# Patient Record
Sex: FEMALE | Race: WHITE | NOT HISPANIC OR LATINO | Employment: UNEMPLOYED | URBAN - NONMETROPOLITAN AREA
[De-identification: names, ages, dates, MRNs, and addresses within clinical notes are randomized per-mention and may not be internally consistent; named-entity substitution may affect disease eponyms.]

---

## 2023-06-22 ENCOUNTER — CLINICAL SUPPORT (OUTPATIENT)
Dept: PRIMARY CARE | Facility: CLINIC | Age: 73
End: 2023-06-22
Payer: MEDICARE

## 2023-06-22 DIAGNOSIS — E03.9 HYPOTHYROIDISM, UNSPECIFIED TYPE: ICD-10-CM

## 2023-06-22 DIAGNOSIS — E55.9 VITAMIN D DEFICIENCY: ICD-10-CM

## 2023-06-22 DIAGNOSIS — E78.5 HYPERLIPIDEMIA, UNSPECIFIED HYPERLIPIDEMIA TYPE: ICD-10-CM

## 2023-06-22 DIAGNOSIS — I10 HYPERTENSION, UNSPECIFIED TYPE: ICD-10-CM

## 2023-06-22 LAB
ALANINE AMINOTRANSFERASE (SGPT) (U/L) IN SER/PLAS: 56 U/L (ref 7–45)
ALBUMIN (G/DL) IN SER/PLAS: 4.4 G/DL (ref 3.4–5)
ALKALINE PHOSPHATASE (U/L) IN SER/PLAS: 81 U/L (ref 33–136)
ANION GAP IN SER/PLAS: 16 MMOL/L (ref 10–20)
ASPARTATE AMINOTRANSFERASE (SGOT) (U/L) IN SER/PLAS: 32 U/L (ref 9–39)
BILIRUBIN TOTAL (MG/DL) IN SER/PLAS: 1.3 MG/DL (ref 0–1.2)
CALCIUM (MG/DL) IN SER/PLAS: 9.4 MG/DL (ref 8.6–10.3)
CARBON DIOXIDE, TOTAL (MMOL/L) IN SER/PLAS: 26 MMOL/L (ref 21–32)
CHLORIDE (MMOL/L) IN SER/PLAS: 103 MMOL/L (ref 98–107)
CHOLESTEROL (MG/DL) IN SER/PLAS: 136 MG/DL (ref 0–199)
CHOLESTEROL IN HDL (MG/DL) IN SER/PLAS: 62.4 MG/DL
CHOLESTEROL/HDL RATIO: 2.2
CREATININE (MG/DL) IN SER/PLAS: 0.86 MG/DL (ref 0.5–1.05)
GFR FEMALE: 72 ML/MIN/1.73M2
GLUCOSE (MG/DL) IN SER/PLAS: 139 MG/DL (ref 74–99)
LDL: 55 MG/DL (ref 0–99)
POTASSIUM (MMOL/L) IN SER/PLAS: 4.3 MMOL/L (ref 3.5–5.3)
PROTEIN TOTAL: 6.9 G/DL (ref 6.4–8.2)
SODIUM (MMOL/L) IN SER/PLAS: 141 MMOL/L (ref 136–145)
TRIGLYCERIDE (MG/DL) IN SER/PLAS: 95 MG/DL (ref 0–149)
UREA NITROGEN (MG/DL) IN SER/PLAS: 16 MG/DL (ref 6–23)
VLDL: 19 MG/DL (ref 0–40)

## 2023-06-22 PROCEDURE — 82306 VITAMIN D 25 HYDROXY: CPT

## 2023-06-22 PROCEDURE — 80061 LIPID PANEL: CPT

## 2023-06-22 PROCEDURE — 80053 COMPREHEN METABOLIC PANEL: CPT

## 2023-06-22 PROCEDURE — 84443 ASSAY THYROID STIM HORMONE: CPT

## 2023-06-23 LAB
CALCIDIOL (25 OH VITAMIN D3) (NG/ML) IN SER/PLAS: 29 NG/ML
THYROTROPIN (MIU/L) IN SER/PLAS BY DETECTION LIMIT <= 0.05 MIU/L: 1.59 MIU/L (ref 0.44–3.98)

## 2023-06-26 ENCOUNTER — OFFICE VISIT (OUTPATIENT)
Dept: PRIMARY CARE | Facility: CLINIC | Age: 73
End: 2023-06-26
Payer: MEDICARE

## 2023-06-26 VITALS
BODY MASS INDEX: 31.81 KG/M2 | OXYGEN SATURATION: 97 % | WEIGHT: 197.9 LBS | TEMPERATURE: 96.7 F | DIASTOLIC BLOOD PRESSURE: 80 MMHG | HEIGHT: 66 IN | HEART RATE: 107 BPM | SYSTOLIC BLOOD PRESSURE: 120 MMHG

## 2023-06-26 DIAGNOSIS — Z12.31 ENCOUNTER FOR SCREENING MAMMOGRAM FOR BREAST CANCER: ICD-10-CM

## 2023-06-26 DIAGNOSIS — Z00.00 ROUTINE GENERAL MEDICAL EXAMINATION AT HEALTH CARE FACILITY: Primary | ICD-10-CM

## 2023-06-26 PROCEDURE — 1159F MED LIST DOCD IN RCRD: CPT | Performed by: FAMILY MEDICINE

## 2023-06-26 PROCEDURE — 1170F FXNL STATUS ASSESSED: CPT | Performed by: FAMILY MEDICINE

## 2023-06-26 PROCEDURE — G0439 PPPS, SUBSEQ VISIT: HCPCS | Performed by: FAMILY MEDICINE

## 2023-06-26 RX ORDER — ATORVASTATIN CALCIUM 40 MG/1
40 TABLET, FILM COATED ORAL DAILY
COMMUNITY
End: 2023-10-17

## 2023-06-26 RX ORDER — CLONAZEPAM 0.5 MG/1
0.5 TABLET ORAL DAILY PRN
COMMUNITY
Start: 2023-04-21

## 2023-06-26 RX ORDER — AMLODIPINE BESYLATE 5 MG/1
5 TABLET ORAL DAILY
COMMUNITY
End: 2023-10-17

## 2023-06-26 RX ORDER — OLANZAPINE 15 MG/1
2 TABLET ORAL
COMMUNITY
Start: 2015-01-16

## 2023-06-26 RX ORDER — DESVENLAFAXINE SUCCINATE 50 MG/1
1 TABLET, EXTENDED RELEASE ORAL
COMMUNITY
Start: 2013-04-15

## 2023-06-26 RX ORDER — CARBIDOPA AND LEVODOPA 25; 100 MG/1; MG/1
TABLET ORAL
COMMUNITY
End: 2023-10-13 | Stop reason: SDUPTHER

## 2023-06-26 ASSESSMENT — ACTIVITIES OF DAILY LIVING (ADL)
DRESSING: INDEPENDENT
MANAGING_FINANCES: INDEPENDENT
BATHING: INDEPENDENT
GROCERY_SHOPPING: INDEPENDENT
DOING_HOUSEWORK: INDEPENDENT
TAKING_MEDICATION: INDEPENDENT

## 2023-06-26 ASSESSMENT — ENCOUNTER SYMPTOMS
NERVOUS/ANXIOUS: 0
OCCASIONAL FEELINGS OF UNSTEADINESS: 0
FEVER: 0
JOINT SWELLING: 0
DIZZINESS: 0
EYE ITCHING: 0
PALPITATIONS: 0
EYE DISCHARGE: 0
LOSS OF SENSATION IN FEET: 0
SORE THROAT: 0
SLEEP DISTURBANCE: 0
LIGHT-HEADEDNESS: 0
BLOOD IN STOOL: 0
HEADACHES: 0
FLANK PAIN: 0
NUMBNESS: 0
ACTIVITY CHANGE: 0
DYSPHORIC MOOD: 0
DYSURIA: 0
COUGH: 0
DEPRESSION: 0
WEAKNESS: 0
CONSTIPATION: 0
ABDOMINAL PAIN: 0
ARTHRALGIAS: 0
APPETITE CHANGE: 0
DIARRHEA: 0
SINUS PRESSURE: 0
MYALGIAS: 0
WHEEZING: 0
NAUSEA: 0
VOMITING: 0
SHORTNESS OF BREATH: 0
UNEXPECTED WEIGHT CHANGE: 0
RHINORRHEA: 0
HEMATURIA: 0

## 2023-06-26 ASSESSMENT — PATIENT HEALTH QUESTIONNAIRE - PHQ9
1. LITTLE INTEREST OR PLEASURE IN DOING THINGS: NOT AT ALL
SUM OF ALL RESPONSES TO PHQ9 QUESTIONS 1 AND 2: 0
2. FEELING DOWN, DEPRESSED OR HOPELESS: NOT AT ALL

## 2023-06-26 ASSESSMENT — VISUAL ACUITY
OS_CC: 20/20
OD_CC: 20/20

## 2023-06-26 NOTE — PROGRESS NOTES
"Subjective   Reason for Visit: Blossom Aguilar is an 72 y.o. female here for a Medicare Wellness visit.          Reviewed all medications by prescribing practitioner or clinical pharmacist (such as prescriptions, OTCs, herbal therapies and supplements) and documented in the medical record.    HPIFEELS GOOD MOST DAYS. HAS STABLE PARKINSON\"S HYPERTENSION AND HYPOTHYROID   LIPIDS ARE STABLE  LIVER ENZYMES ELEVATED BUT STABLE SUSPECT NALD     Patient Care Team:  Ashia Galarza DO as PCP - General     Review of Systems   Constitutional:  Negative for activity change, appetite change, fever and unexpected weight change.   HENT:  Negative for congestion, ear pain, postnasal drip, rhinorrhea, sinus pressure and sore throat.    Eyes:  Negative for discharge, itching and visual disturbance.   Respiratory:  Negative for cough, shortness of breath and wheezing.    Cardiovascular:  Negative for chest pain, palpitations and leg swelling.   Gastrointestinal:  Negative for abdominal pain, blood in stool, constipation, diarrhea, nausea and vomiting.   Endocrine: Negative for cold intolerance, heat intolerance and polyuria.   Genitourinary:  Negative for dysuria, flank pain and hematuria.   Musculoskeletal:  Negative for arthralgias, gait problem, joint swelling and myalgias.   Skin:  Negative for rash.   Allergic/Immunologic: Negative for environmental allergies and food allergies.   Neurological:  Negative for dizziness, syncope, weakness, light-headedness, numbness and headaches.   Psychiatric/Behavioral:  Negative for dysphoric mood and sleep disturbance. The patient is not nervous/anxious.        Objective   Vitals:  /80   Pulse 107   Temp 35.9 °C (96.7 °F)   Ht 1.676 m (5' 6\")   Wt 89.8 kg (197 lb 14.4 oz)   SpO2 97%   BMI 31.94 kg/m²       Physical Exam  Constitutional:       Appearance: Normal appearance.   HENT:      Head: Normocephalic and atraumatic.      Nose: Nose normal.      Mouth/Throat:      Mouth: " Mucous membranes are moist.   Eyes:      Extraocular Movements: Extraocular movements intact.      Pupils: Pupils are equal, round, and reactive to light.   Cardiovascular:      Rate and Rhythm: Normal rate and regular rhythm.   Pulmonary:      Effort: Pulmonary effort is normal.      Breath sounds: Normal breath sounds.   Abdominal:      Palpations: Abdomen is soft.   Musculoskeletal:         General: Normal range of motion.      Cervical back: Normal range of motion and neck supple.   Skin:     General: Skin is warm and dry.   Neurological:      General: No focal deficit present.      Mental Status: She is alert and oriented to person, place, and time. Mental status is at baseline.      Coordination: Coordination abnormal.      Gait: Gait normal.      Comments: PARKINSONIAN TREMORS AND MASKED FACIES    Psychiatric:         Mood and Affect: Mood normal.         Behavior: Behavior normal.         Assessment/Plan   Problem List Items Addressed This Visit    None  Visit Diagnoses       Routine general medical examination at health care facility    -  Primary    Encounter for screening mammogram for breast cancer        Relevant Orders    BI mammo bilateral screening tomosynthesis

## 2023-06-26 NOTE — PATIENT INSTRUCTIONS
See in 6 months   TAKE SOME VIT D OTC LIKE 4000 UNITS DAILY  REVIEWED LABS AND THEY ARE GOOD AND STABLE

## 2023-07-07 ENCOUNTER — TELEPHONE (OUTPATIENT)
Dept: PRIMARY CARE | Facility: CLINIC | Age: 73
End: 2023-07-07
Payer: MEDICARE

## 2023-07-07 NOTE — TELEPHONE ENCOUNTER
----- Message from Ashia Galarza DO sent at 7/7/2023  1:19 PM EDT -----  CALL PATIENT THAT HER MAMMOGRAM IS NORMAL FOLLOW IN ONE YEAR AT [facility]

## 2023-10-13 DIAGNOSIS — G20.A1 PARKINSON'S DISEASE WITHOUT DYSKINESIA OR FLUCTUATING MANIFESTATIONS (MULTI): Primary | ICD-10-CM

## 2023-10-13 RX ORDER — CARBIDOPA AND LEVODOPA 25; 100 MG/1; MG/1
1 TABLET ORAL 3 TIMES DAILY
Qty: 90 TABLET | Refills: 3 | Status: SHIPPED | OUTPATIENT
Start: 2023-10-13 | End: 2024-01-03 | Stop reason: SDUPTHER

## 2023-10-16 DIAGNOSIS — E78.5 HYPERLIPIDEMIA, UNSPECIFIED: ICD-10-CM

## 2023-10-16 DIAGNOSIS — I10 ESSENTIAL (PRIMARY) HYPERTENSION: ICD-10-CM

## 2023-10-17 RX ORDER — ATORVASTATIN CALCIUM 40 MG/1
40 TABLET, FILM COATED ORAL DAILY
Qty: 90 TABLET | Refills: 1 | Status: SHIPPED | OUTPATIENT
Start: 2023-10-17 | End: 2024-04-08

## 2023-10-17 RX ORDER — AMLODIPINE BESYLATE 5 MG/1
5 TABLET ORAL DAILY
Qty: 90 TABLET | Refills: 1 | Status: SHIPPED | OUTPATIENT
Start: 2023-10-17 | End: 2024-04-08

## 2023-10-18 RX ORDER — AMLODIPINE BESYLATE 5 MG/1
5 TABLET ORAL DAILY
Qty: 90 TABLET | Refills: 1 | OUTPATIENT
Start: 2023-10-18

## 2023-10-18 RX ORDER — ATORVASTATIN CALCIUM 40 MG/1
40 TABLET, FILM COATED ORAL DAILY
Qty: 90 TABLET | Refills: 1 | OUTPATIENT
Start: 2023-10-18

## 2023-12-07 ENCOUNTER — HOSPITAL ENCOUNTER (OUTPATIENT)
Dept: CARDIOLOGY | Facility: HOSPITAL | Age: 73
Discharge: HOME | End: 2023-12-07
Payer: MEDICARE

## 2023-12-07 ENCOUNTER — HOSPITAL ENCOUNTER (EMERGENCY)
Facility: HOSPITAL | Age: 73
Discharge: HOME | End: 2023-12-07
Attending: EMERGENCY MEDICINE
Payer: MEDICARE

## 2023-12-07 ENCOUNTER — APPOINTMENT (OUTPATIENT)
Dept: RADIOLOGY | Facility: HOSPITAL | Age: 73
End: 2023-12-07
Payer: MEDICARE

## 2023-12-07 VITALS
HEIGHT: 67 IN | OXYGEN SATURATION: 96 % | TEMPERATURE: 97.8 F | WEIGHT: 197 LBS | DIASTOLIC BLOOD PRESSURE: 56 MMHG | HEART RATE: 85 BPM | RESPIRATION RATE: 16 BRPM | SYSTOLIC BLOOD PRESSURE: 151 MMHG | BODY MASS INDEX: 30.92 KG/M2

## 2023-12-07 DIAGNOSIS — R42 VERTIGO: Primary | ICD-10-CM

## 2023-12-07 DIAGNOSIS — R03.0 ELEVATED BLOOD PRESSURE READING: ICD-10-CM

## 2023-12-07 LAB
ANION GAP SERPL CALC-SCNC: 12 MMOL/L (ref 10–20)
BASOPHILS # BLD AUTO: 0.05 X10*3/UL (ref 0–0.1)
BASOPHILS NFR BLD AUTO: 0.8 %
BUN SERPL-MCNC: 13 MG/DL (ref 6–23)
CALCIUM SERPL-MCNC: 9.9 MG/DL (ref 8.6–10.3)
CARDIAC TROPONIN I PNL SERPL HS: 9 NG/L (ref 0–13)
CHLORIDE SERPL-SCNC: 106 MMOL/L (ref 98–107)
CO2 SERPL-SCNC: 28 MMOL/L (ref 21–32)
CREAT SERPL-MCNC: 0.86 MG/DL (ref 0.5–1.05)
EOSINOPHIL # BLD AUTO: 0.07 X10*3/UL (ref 0–0.4)
EOSINOPHIL NFR BLD AUTO: 1.1 %
ERYTHROCYTE [DISTWIDTH] IN BLOOD BY AUTOMATED COUNT: 12.8 % (ref 11.5–14.5)
GFR SERPL CREATININE-BSD FRML MDRD: 72 ML/MIN/1.73M*2
GLUCOSE BLD MANUAL STRIP-MCNC: 145 MG/DL (ref 74–99)
GLUCOSE SERPL-MCNC: 140 MG/DL (ref 74–99)
HCT VFR BLD AUTO: 44 % (ref 36–46)
HGB BLD-MCNC: 14.5 G/DL (ref 12–16)
IMM GRANULOCYTES # BLD AUTO: 0.01 X10*3/UL (ref 0–0.5)
IMM GRANULOCYTES NFR BLD AUTO: 0.2 % (ref 0–0.9)
LACTATE SERPL-SCNC: 1.6 MMOL/L (ref 0.4–2)
LYMPHOCYTES # BLD AUTO: 1.82 X10*3/UL (ref 0.8–3)
LYMPHOCYTES NFR BLD AUTO: 27.6 %
MCH RBC QN AUTO: 31.5 PG (ref 26–34)
MCHC RBC AUTO-ENTMCNC: 33 G/DL (ref 32–36)
MCV RBC AUTO: 96 FL (ref 80–100)
MONOCYTES # BLD AUTO: 0.59 X10*3/UL (ref 0.05–0.8)
MONOCYTES NFR BLD AUTO: 9 %
NEUTROPHILS # BLD AUTO: 4.05 X10*3/UL (ref 1.6–5.5)
NEUTROPHILS NFR BLD AUTO: 61.3 %
NRBC BLD-RTO: 0 /100 WBCS (ref 0–0)
PHOSPHATE SERPL-MCNC: 3.1 MG/DL (ref 2.5–4.9)
PLATELET # BLD AUTO: 246 X10*3/UL (ref 150–450)
POTASSIUM SERPL-SCNC: 3.8 MMOL/L (ref 3.5–5.3)
RBC # BLD AUTO: 4.6 X10*6/UL (ref 4–5.2)
SODIUM SERPL-SCNC: 142 MMOL/L (ref 136–145)
WBC # BLD AUTO: 6.6 X10*3/UL (ref 4.4–11.3)

## 2023-12-07 PROCEDURE — 2500000001 HC RX 250 WO HCPCS SELF ADMINISTERED DRUGS (ALT 637 FOR MEDICARE OP): Performed by: EMERGENCY MEDICINE

## 2023-12-07 PROCEDURE — 82947 ASSAY GLUCOSE BLOOD QUANT: CPT

## 2023-12-07 PROCEDURE — 93005 ELECTROCARDIOGRAM TRACING: CPT

## 2023-12-07 PROCEDURE — 2500000004 HC RX 250 GENERAL PHARMACY W/ HCPCS (ALT 636 FOR OP/ED): Performed by: EMERGENCY MEDICINE

## 2023-12-07 PROCEDURE — 84100 ASSAY OF PHOSPHORUS: CPT | Performed by: EMERGENCY MEDICINE

## 2023-12-07 PROCEDURE — 70450 CT HEAD/BRAIN W/O DYE: CPT | Performed by: RADIOLOGY

## 2023-12-07 PROCEDURE — 36415 COLL VENOUS BLD VENIPUNCTURE: CPT | Performed by: EMERGENCY MEDICINE

## 2023-12-07 PROCEDURE — 96360 HYDRATION IV INFUSION INIT: CPT

## 2023-12-07 PROCEDURE — 84484 ASSAY OF TROPONIN QUANT: CPT | Performed by: EMERGENCY MEDICINE

## 2023-12-07 PROCEDURE — 80048 BASIC METABOLIC PNL TOTAL CA: CPT | Performed by: EMERGENCY MEDICINE

## 2023-12-07 PROCEDURE — 70450 CT HEAD/BRAIN W/O DYE: CPT

## 2023-12-07 PROCEDURE — 99285 EMERGENCY DEPT VISIT HI MDM: CPT | Mod: 25

## 2023-12-07 PROCEDURE — 83605 ASSAY OF LACTIC ACID: CPT | Performed by: EMERGENCY MEDICINE

## 2023-12-07 PROCEDURE — 85025 COMPLETE CBC W/AUTO DIFF WBC: CPT | Performed by: EMERGENCY MEDICINE

## 2023-12-07 PROCEDURE — 99284 EMERGENCY DEPT VISIT MOD MDM: CPT | Performed by: EMERGENCY MEDICINE

## 2023-12-07 RX ORDER — MECLIZINE HYDROCHLORIDE 25 MG/1
25 TABLET ORAL ONCE
Status: COMPLETED | OUTPATIENT
Start: 2023-12-07 | End: 2023-12-07

## 2023-12-07 RX ORDER — MECLIZINE HYDROCHLORIDE 25 MG/1
25 TABLET ORAL 3 TIMES DAILY PRN
Qty: 20 TABLET | Refills: 0 | Status: SHIPPED | OUTPATIENT
Start: 2023-12-07 | End: 2023-12-14

## 2023-12-07 RX ADMIN — SODIUM CHLORIDE 500 ML: 9 INJECTION, SOLUTION INTRAVENOUS at 06:12

## 2023-12-07 RX ADMIN — MECLIZINE HYDROCHLORIDE 25 MG: 25 TABLET ORAL at 06:12

## 2023-12-07 ASSESSMENT — PAIN SCALES - GENERAL
PAINLEVEL_OUTOF10: 0 - NO PAIN

## 2023-12-07 ASSESSMENT — COLUMBIA-SUICIDE SEVERITY RATING SCALE - C-SSRS
1. IN THE PAST MONTH, HAVE YOU WISHED YOU WERE DEAD OR WISHED YOU COULD GO TO SLEEP AND NOT WAKE UP?: NO
6. HAVE YOU EVER DONE ANYTHING, STARTED TO DO ANYTHING, OR PREPARED TO DO ANYTHING TO END YOUR LIFE?: NO
2. HAVE YOU ACTUALLY HAD ANY THOUGHTS OF KILLING YOURSELF?: NO

## 2023-12-07 ASSESSMENT — PAIN - FUNCTIONAL ASSESSMENT: PAIN_FUNCTIONAL_ASSESSMENT: 0-10

## 2023-12-07 NOTE — ED PROVIDER NOTES
HPI   Chief Complaint   Patient presents with    Dizziness     Pt reports dizziness that started 1 hour prior to arrival. Pt reports stood up and gets dizzy and fell to ground. Pt denies hitting head but reports has to sit down when she stands because she is dizzy. Pt denies history of dizziness and denies trauma or injury.         History provided by:  Patient, significant other and medical records   used: No         This patient presents to the emergency department ambulatory via private vehicle with chief complaint of dizziness.  Patient states that she got up to use the bathroom and when she stood up everything seemed to be spinning.  She had to sit down on the ground to prevent herself from falling.  She says the dizziness made it hard to get back up so she woke her  up and he brought her in.  She denies any injury.  No headache, no vision change, No speech deficit, weakness, numbness or tingling in extremities.  No chest pain, palpitations, nausea, vomiting.  No recent fevers, chills, coughs, URI symptoms.  No ear pain or sore throat.    Patient states that she went to bed around 11:00 and was feeling fine then.  She denies similar episodes previously.  No recent fevers, chills, coughs, URI symptoms.  She has a history of Parkinson's disease with resting tremors but she does not feel those are any different.               [place] Coma Scale Score: 15                  Patient History   Past Medical History:   Diagnosis Date    Benign neoplasm of left breast 03/06/2014    Benign neoplasm of breast, left    Depression     Encounter for screening mammogram for malignant neoplasm of breast     Visit for screening mammogram    Hypertension     Mammographic calcification found on diagnostic imaging of breast 02/13/2014    Breast calcifications    Other abnormal and inconclusive findings on diagnostic imaging of breast 02/13/2014    Abnormal mammogram    Parkinsons     Personal history of  other diseases of the respiratory system 05/30/2017    History of acute bronchitis    Personal history of other endocrine, nutritional and metabolic disease 05/20/2021    History of vitamin D deficiency    Personal history of other medical treatment     History of bone scan     Past Surgical History:   Procedure Laterality Date    COLONOSCOPY  04/21/2014    Complete Colonoscopy    MR HEAD ANGIO WO IV CONTRAST  4/2/2012    MR HEAD ANGIO WO IV CONTRAST 4/2/2012 [facility]LL[facility] LEGACY    OTHER SURGICAL HISTORY  03/06/2014    Bx Breast Percutan Needle Core Use Imag Guide (Stereotactic)     No family history on file.  Social History     Tobacco Use    Smoking status: Never    Smokeless tobacco: Never   Substance Use Topics    Alcohol use: Never    Drug use: Never       Physical Exam   ED Triage Vitals [12/07/23 0529]   Temp Heart Rate Resp BP   37.4 °C (99.3 °F) 100 18 (!) 156/92      SpO2 Temp Source Heart Rate Source Patient Position   97 % Temporal Monitor Sitting      BP Location FiO2 (%)     Right arm --       Physical Exam  Vitals reviewed.   Constitutional:       Appearance: Normal appearance.   HENT:      Head: Normocephalic and atraumatic.      Right Ear: Tympanic membrane normal.      Left Ear: Tympanic membrane normal.      Nose: Nose normal.      Mouth/Throat:      Mouth: Mucous membranes are moist.      Pharynx: Oropharynx is clear.   Eyes:      Extraocular Movements: Extraocular movements intact.      Pupils: Pupils are equal, round, and reactive to light.      Comments: No nystagmus   Neck:      Vascular: No carotid bruit.   Cardiovascular:      Rate and Rhythm: Normal rate and regular rhythm.      Pulses: Normal pulses.      Heart sounds: Normal heart sounds.   Pulmonary:      Effort: Pulmonary effort is normal.      Breath sounds: Normal breath sounds.   Abdominal:      Palpations: Abdomen is soft.      Tenderness: There is no abdominal tenderness.   Musculoskeletal:         General: Normal range of  motion.      Cervical back: Normal range of motion and neck supple. No rigidity.   Skin:     General: Skin is warm and dry.      Capillary Refill: Capillary refill takes less than 2 seconds.   Neurological:      General: No focal deficit present.      Mental Status: She is alert and oriented to person, place, and time.      Comments: On neuro exam: Alert and oriented X3. GCS 4-5-6. CN intact. Strength 5/5 in all 4 extremities. Sensation is intact to light touch throughout. Reflexes 2+ and symmetric. Normal Finger-nose-finger.   No ataxia. NIH 0.  Resting tremor related to Parkinson's disease.     Psychiatric:         Mood and Affect: Mood normal.           Labs Reviewed   BASIC METABOLIC PANEL - Abnormal       Result Value    Glucose 140 (*)     Sodium 142      Potassium 3.8      Chloride 106      Bicarbonate 28      Anion Gap 12      Urea Nitrogen 13      Creatinine 0.86      eGFR 72      Calcium 9.9     POCT GLUCOSE - Abnormal    POCT Glucose 145 (*)    LACTATE - Normal    Lactate 1.6      Narrative:     Venipuncture immediately after or during the administration of Metamizole may lead to falsely low results. Testing should be performed immediately  prior to Metamizole dosing.   TROPONIN I, HIGH SENSITIVITY - Normal    Troponin I, High Sensitivity 9      Narrative:     Less than 99th percentile of normal range cutoff-  Female and children under 18 years old <14 ng/L; Male <21 ng/L: Negative  Repeat testing should be performed if clinically indicated.     Female and children under 18 years old 14-50 ng/L; Male 21-50 ng/L:  Consistent with possible cardiac damage and possible increased clinical   risk. Serial measurements may help to assess extent of myocardial damage.     >50 ng/L: Consistent with cardiac damage, increased clinical risk and  myocardial infarction. Serial measurements may help assess extent of   myocardial damage.      NOTE: Children less than 1 year old may have higher baseline troponin   levels  and results should be interpreted in conjunction with the overall   clinical context.     NOTE: Troponin I testing is performed using a different   testing methodology at [facility] than at other   [facility] hospitals. Direct result comparisons should only   be made within the same method.   PHOSPHORUS - Normal    Phosphorus 3.1     CBC WITH AUTO DIFFERENTIAL    WBC 6.6      nRBC 0.0      RBC 4.60      Hemoglobin 14.5      Hematocrit 44.0      MCV 96      MCH 31.5      MCHC 33.0      RDW 12.8      Platelets 246      Neutrophils % 61.3      Immature Granulocytes %, Automated 0.2      Lymphocytes % 27.6      Monocytes % 9.0      Eosinophils % 1.1      Basophils % 0.8      Neutrophils Absolute 4.05      Immature Granulocytes Absolute, Automated 0.01      Lymphocytes Absolute 1.82      Monocytes Absolute 0.59      Eosinophils Absolute 0.07      Basophils Absolute 0.05       CT head wo IV contrast   Final Result   No acute intracranial hemorrhage, mass effect or midline shift.             MACRO:   None.        Signed by: Evan Finkelstein 2023 6:09 AM   Dictation workstation:   [id number]        ED Medication Administration from 2023 0518 to 2023 0645         Date/Time Order Dose Route Action Action by     2023 0612 EST meclizine (Antivert) tablet 25 mg 25 mg oral Given DIANA Kate     2023 0612 EST sodium chloride 0.9 % bolus 500 mL 500 mL intravenous New Bag DIANA Kate          'EK --twelve-lead EKG was obtained and read by me. This demonstrates normal sinus rhythm with a rate of 100, poor R wave progression, normal intervals, no ectopy, no ischemia, no pericarditis. Compared to most recent prior EKG, normal sinus rhythm has replaced sinus tachycardia.  ED Course & MDM   Diagnoses as of 23 0708   Vertigo   Elevated blood pressure reading     0640 -- feeling much better, waiting for results  0655 --up ambulatory to the bathroom without any visible ataxia, gait  disturbance.  Results reviewed    Medical Decision Making  This patient presents to the emergency department with the above history and physical.  Patient has no signs of sepsis, dehydration, acute stroke syndrome, trauma.  No evidence of otitis, mastoiditis, sinusitis.  Acute positional vertigo is present.  Differential diagnosis includes, but is not limited to sinusitis, stroke, arrhythmia, dehydration, orthostasis, hypotension, anemia, electrolyte imbalance.    Evaluation in the emergency department includes negative head CT, normal EKG, normal chemistries except for glucose of 140 without any evidence of DKA or nonketotic hyperosmolar coma.  Patient had borderline positive orthostatic vital signs; therefore, treated symptomatically with oral meclizine and a 500 cc fluid bolus.  She is feeling much better after this.  Symptoms most consistent with peripheral vertigo.  Prescription provided for meclizine.  Safety measures discussed.  Patient's  present at the bed and advised of all findings and recommendations.    Patient presented to the emergency room with asymptomatic hypertension. There is no evidence of end organ dysfunction. There is no indication to acutely lower the blood pressure. Importance of follow-up was strongly stressed.    Results of exam and any testing were discussed with patient/family. To the best of my ability, I answered all questions. At this time, there is no indication for admission/transfer or further diagnostic testing. Patient understands to return for any new or worsening symptoms, or failure to improve as anticipated. The importance of follow-up was stressed.    Procedure  Procedures     Macrina Youssef MD  12/07/23 7194

## 2023-12-07 NOTE — DISCHARGE INSTRUCTIONS
Change positions slowly and gradually.  Drink plenty of fluids.    Avoid activities which could be dangerous if you get a dizzy spells such as driving, climbing, swimming, operating power tools or machinery.    Return to the emergency department for uncontrolled vomiting falls or injuries, any new neurologic symptoms such as speech problems, vision changes, facial drooping, weakness of an arm or leg.

## 2023-12-12 ENCOUNTER — OFFICE VISIT (OUTPATIENT)
Dept: PRIMARY CARE | Facility: CLINIC | Age: 73
End: 2023-12-12
Payer: MEDICARE

## 2023-12-12 VITALS
OXYGEN SATURATION: 95 % | SYSTOLIC BLOOD PRESSURE: 128 MMHG | TEMPERATURE: 97.7 F | HEART RATE: 107 BPM | WEIGHT: 200 LBS | BODY MASS INDEX: 31.32 KG/M2 | DIASTOLIC BLOOD PRESSURE: 64 MMHG

## 2023-12-12 DIAGNOSIS — R42 VERTIGO: Primary | ICD-10-CM

## 2023-12-12 PROCEDURE — 1036F TOBACCO NON-USER: CPT | Performed by: FAMILY MEDICINE

## 2023-12-12 PROCEDURE — 1159F MED LIST DOCD IN RCRD: CPT | Performed by: FAMILY MEDICINE

## 2023-12-12 PROCEDURE — 99214 OFFICE O/P EST MOD 30 MIN: CPT | Performed by: FAMILY MEDICINE

## 2023-12-12 PROCEDURE — 1126F AMNT PAIN NOTED NONE PRSNT: CPT | Performed by: FAMILY MEDICINE

## 2023-12-12 RX ORDER — MECLIZINE HYDROCHLORIDE 25 MG/1
25 TABLET ORAL 3 TIMES DAILY PRN
Qty: 30 TABLET | Refills: 11 | Status: SHIPPED | OUTPATIENT
Start: 2023-12-12 | End: 2024-12-11

## 2023-12-12 ASSESSMENT — ENCOUNTER SYMPTOMS
ABDOMINAL PAIN: 0
NUMBNESS: 0
WEAKNESS: 0
EYE ITCHING: 0
DYSPHORIC MOOD: 0
SORE THROAT: 0
CONSTIPATION: 0
DYSURIA: 0
COUGH: 0
LIGHT-HEADEDNESS: 0
ACTIVITY CHANGE: 0
DIZZINESS: 1
TREMORS: 1
HEADACHES: 0
APPETITE CHANGE: 0
FLANK PAIN: 0
NAUSEA: 0
PALPITATIONS: 0
SINUS PRESSURE: 0
SLEEP DISTURBANCE: 0
EYE DISCHARGE: 0
RHINORRHEA: 0
DIARRHEA: 0
NERVOUS/ANXIOUS: 0
JOINT SWELLING: 0
HEMATURIA: 0
VOMITING: 0
WHEEZING: 0
BLOOD IN STOOL: 0
SHORTNESS OF BREATH: 0
UNEXPECTED WEIGHT CHANGE: 0
MYALGIAS: 0
ARTHRALGIAS: 0
FEVER: 0

## 2023-12-12 NOTE — PATIENT INSTRUCTIONS
REFILLED ANTIVERT (MECLIZINE)  SEE THE NEUROLOGIST IN JANUARY LIKE PLANNED   FOLLOW HERE WHEN NEEDED     GET THAT FLU VACCINE AT DRUG STORE OR HEALTH DEPARTMENT

## 2023-12-12 NOTE — PROGRESS NOTES
"Subjective   Patient ID: Blossom Aguilar is a 73 y.o. female who presents for Hospital Follow-up ([place]- Vertigo- Labs, IV, and CT completed. Meclizine given.  Blossom is feeling better and has a short term of the Meclizine at home.).    HPI GOT UP AND HAD WHIRLING VERTIGO AND FELL  MECLIZINE HELPS     Review of Systems   Constitutional:  Negative for activity change, appetite change, fever and unexpected weight change.   HENT:  Negative for congestion, ear pain, postnasal drip, rhinorrhea, sinus pressure and sore throat.    Eyes:  Negative for discharge, itching and visual disturbance.   Respiratory:  Negative for cough, shortness of breath and wheezing.    Cardiovascular:  Negative for chest pain, palpitations and leg swelling.   Gastrointestinal:  Negative for abdominal pain, blood in stool, constipation, diarrhea, nausea and vomiting.   Endocrine: Negative for cold intolerance, heat intolerance and polyuria.   Genitourinary:  Negative for dysuria, flank pain and hematuria.   Musculoskeletal:  Negative for arthralgias, gait problem, joint swelling and myalgias.   Skin:  Negative for rash.   Allergic/Immunologic: Negative for environmental allergies and food allergies.   Neurological:  Positive for dizziness and tremors. Negative for syncope, weakness, light-headedness, numbness and headaches.        BETTER WITH THE VERTIGO WITH MEDICINE   TREMORS FROM HER PARKINSON\"S DS    Psychiatric/Behavioral:  Negative for dysphoric mood and sleep disturbance. The patient is not nervous/anxious.        Objective   /64   Pulse 107   Temp 36.5 °C (97.7 °F)   Wt 90.7 kg (200 lb)   SpO2 95%   BMI 31.32 kg/m²     Physical Exam  Vitals and nursing note reviewed.   Constitutional:       Appearance: Normal appearance.   HENT:      Head: Normocephalic.      Mouth/Throat:      Mouth: Mucous membranes are moist.   Cardiovascular:      Rate and Rhythm: Normal rate and regular rhythm.      Pulses: Normal pulses.      Heart " sounds: Normal heart sounds. No murmur heard.     No friction rub. No gallop.   Pulmonary:      Effort: Pulmonary effort is normal. No respiratory distress.      Breath sounds: Normal breath sounds. No wheezing.   Abdominal:      General: Bowel sounds are normal. There is no distension.      Palpations: Abdomen is soft.      Tenderness: There is no abdominal tenderness.   Musculoskeletal:         General: No deformity. Normal range of motion.   Skin:     General: Skin is warm and dry.      Capillary Refill: Capillary refill takes less than 2 seconds.   Neurological:      General: No focal deficit present.      Mental Status: She is alert and oriented to person, place, and time. Mental status is at baseline.      Coordination: Coordination abnormal.      Gait: Gait abnormal.      Deep Tendon Reflexes: Reflexes abnormal.      Comments: VERTIGO RESOLVED FOR NOW WITH ANTIVERT   SEES NEUROLOGY SOON    Psychiatric:         Mood and Affect: Mood normal.         Assessment/Plan   Diagnoses and all orders for this visit:  Vertigo  -     meclizine (Antivert) 25 mg tablet; Take 1 tablet (25 mg) by mouth 3 times a day as needed for dizziness.

## 2024-01-02 ENCOUNTER — OFFICE VISIT (OUTPATIENT)
Dept: PRIMARY CARE | Facility: CLINIC | Age: 74
End: 2024-01-02
Payer: MEDICARE

## 2024-01-02 VITALS
OXYGEN SATURATION: 99 % | TEMPERATURE: 96.9 F | SYSTOLIC BLOOD PRESSURE: 146 MMHG | HEART RATE: 114 BPM | BODY MASS INDEX: 31.17 KG/M2 | DIASTOLIC BLOOD PRESSURE: 76 MMHG | WEIGHT: 199 LBS

## 2024-01-02 DIAGNOSIS — L30.4 INTERTRIGO: Primary | ICD-10-CM

## 2024-01-02 PROCEDURE — 1159F MED LIST DOCD IN RCRD: CPT | Performed by: FAMILY MEDICINE

## 2024-01-02 PROCEDURE — 1036F TOBACCO NON-USER: CPT | Performed by: FAMILY MEDICINE

## 2024-01-02 PROCEDURE — 99213 OFFICE O/P EST LOW 20 MIN: CPT | Performed by: FAMILY MEDICINE

## 2024-01-02 PROCEDURE — 1126F AMNT PAIN NOTED NONE PRSNT: CPT | Performed by: FAMILY MEDICINE

## 2024-01-02 RX ORDER — NYSTATIN 100000 U/G
CREAM TOPICAL 2 TIMES DAILY
Qty: 30 G | Refills: 3 | Status: SHIPPED | OUTPATIENT
Start: 2024-01-02 | End: 2024-01-09

## 2024-01-02 NOTE — PROGRESS NOTES
Subjective   Patient ID: Blossom Aguilar is a 73 y.o. female who presents for Rash (Moist rash under L breast for 3 weeks.).    HPI PATIENT WAS HERE RECENTLY FOR GENERAL   THIS RASH IS RATHER NEW  JUST WASHING BETTER TO HELP IT     Review of SystemsNO INTERVAL HISTORICAL CHANGES IN ANY OF THE 12 SYSTEMS REVIEWED OTHER THAN  DEVELOPMENT OF A RASH UNDER HER LEFT BREAST     Objective   /76   Pulse (!) 114   Temp 36.1 °C (96.9 °F)   Wt 90.3 kg (199 lb)   SpO2 99%   BMI 31.17 kg/m²     Physical ExamSINCE RECENT VISIT NO INTERVAL PHYSICAL CHANGES IN ANY OF THE 12 SYSTEMS REVIEWED OTHER THAN A RED RASH MOIST UNDER THE LEFT BREAST     Assessment/Plan   Diagnoses and all orders for this visit:  Intertrigo  -     nystatin (Mycostatin) cream; Apply topically 2 times a day for 7 days. apply to affected area

## 2024-01-03 ENCOUNTER — OFFICE VISIT (OUTPATIENT)
Dept: NEUROLOGY | Facility: CLINIC | Age: 74
End: 2024-01-03
Payer: MEDICARE

## 2024-01-03 VITALS
BODY MASS INDEX: 31.39 KG/M2 | HEART RATE: 93 BPM | DIASTOLIC BLOOD PRESSURE: 84 MMHG | SYSTOLIC BLOOD PRESSURE: 150 MMHG | WEIGHT: 200 LBS | HEIGHT: 67 IN

## 2024-01-03 DIAGNOSIS — K11.7 SIALORRHEA: Primary | ICD-10-CM

## 2024-01-03 DIAGNOSIS — G20.A1 PARKINSON'S DISEASE WITHOUT DYSKINESIA OR FLUCTUATING MANIFESTATIONS (MULTI): ICD-10-CM

## 2024-01-03 PROCEDURE — 1160F RVW MEDS BY RX/DR IN RCRD: CPT | Performed by: PSYCHIATRY & NEUROLOGY

## 2024-01-03 PROCEDURE — 1036F TOBACCO NON-USER: CPT | Performed by: PSYCHIATRY & NEUROLOGY

## 2024-01-03 PROCEDURE — 99214 OFFICE O/P EST MOD 30 MIN: CPT | Performed by: PSYCHIATRY & NEUROLOGY

## 2024-01-03 PROCEDURE — 1126F AMNT PAIN NOTED NONE PRSNT: CPT | Performed by: PSYCHIATRY & NEUROLOGY

## 2024-01-03 PROCEDURE — 1159F MED LIST DOCD IN RCRD: CPT | Performed by: PSYCHIATRY & NEUROLOGY

## 2024-01-03 RX ORDER — GLYCOPYRROLATE 1 MG/1
0.5 TABLET ORAL NIGHTLY
Qty: 45 TABLET | Refills: 3 | Status: SHIPPED | OUTPATIENT
Start: 2024-01-03 | End: 2025-01-02

## 2024-01-03 RX ORDER — CARBIDOPA AND LEVODOPA 25; 100 MG/1; MG/1
1 TABLET ORAL 3 TIMES DAILY
Qty: 270 TABLET | Refills: 3 | Status: SHIPPED | OUTPATIENT
Start: 2024-01-03 | End: 2025-01-02

## 2024-01-03 ASSESSMENT — ENCOUNTER SYMPTOMS
VOICE CHANGE: 0
ABDOMINAL PAIN: 0
SPEECH DIFFICULTY: 0
WOUND: 0
APPETITE CHANGE: 0
DIARRHEA: 0
DECREASED CONCENTRATION: 0
JOINT SWELLING: 0
NECK PAIN: 0
SINUS PAIN: 0
DIFFICULTY URINATING: 0
SINUS PRESSURE: 0
EYE REDNESS: 0
LIGHT-HEADEDNESS: 0
BRUISES/BLEEDS EASILY: 0
SLEEP DISTURBANCE: 0
AGITATION: 0
DYSURIA: 0
WHEEZING: 0
BLOOD IN STOOL: 0
APNEA: 0
FREQUENCY: 0
EYE DISCHARGE: 0
HYPERACTIVE: 0
COLOR CHANGE: 0
STRIDOR: 0
NUMBNESS: 0
DIAPHORESIS: 0
DIZZINESS: 0
FLANK PAIN: 0
ANAL BLEEDING: 0
CONFUSION: 0
TREMORS: 1
POLYDIPSIA: 0
PALPITATIONS: 0
FATIGUE: 0
NAUSEA: 0
FACIAL ASYMMETRY: 0
RECTAL PAIN: 0
MYALGIAS: 0
HALLUCINATIONS: 0
SEIZURES: 0
BACK PAIN: 0
FEVER: 0
DYSPHORIC MOOD: 0
ABDOMINAL DISTENTION: 0
HEADACHES: 0
CHILLS: 0
ADENOPATHY: 0
SORE THROAT: 0
VOMITING: 0
CHEST TIGHTNESS: 0
CONSTIPATION: 0
EYE PAIN: 0
TROUBLE SWALLOWING: 0
COUGH: 0
NECK STIFFNESS: 0
ACTIVITY CHANGE: 0
EYE ITCHING: 0
CHOKING: 0
SHORTNESS OF BREATH: 0
NERVOUS/ANXIOUS: 0
ARTHRALGIAS: 0

## 2024-01-03 ASSESSMENT — VISUAL ACUITY: VA_NORMAL: 1

## 2024-01-03 NOTE — PROGRESS NOTES
Consults    Chief complaint: Parkinson's disease: follow up and new problem : Sialorrhea    History Of Present Illness  Last time she was seen before COVID pandemic and she is here for follow-up after long time.     Blossom Aguilar is a 73 y.o. female presenting with PD and on Sinemet 25/101 pill 3 times a day.  Patient is doing very good on it there is no interval progression and bradykinesia rigidity or any history of falls.  She has chronic stable resting tremors and anxiety associated tremors which has not changed and she is not interested to get any more medications regarding tremor control.  I mention about amantadine we can add that if needed for tremor control but she does not want any pills.  In addition she is complaining about severe lutea at night so for that I added glycopyrrolate.  Patient remains independent for daily life activities dressing bathing eating.    Past Medical and Surgical History    Past Medical History:   Diagnosis Date    Benign neoplasm of left breast 03/06/2014    Benign neoplasm of breast, left    Depression     Encounter for screening mammogram for malignant neoplasm of breast     Visit for screening mammogram    Hypertension     Mammographic calcification found on diagnostic imaging of breast 02/13/2014    Breast calcifications    Other abnormal and inconclusive findings on diagnostic imaging of breast 02/13/2014    Abnormal mammogram    Parkinsons     Personal history of other diseases of the respiratory system 05/30/2017    History of acute bronchitis    Personal history of other endocrine, nutritional and metabolic disease 05/20/2021    History of vitamin D deficiency    Personal history of other medical treatment     History of bone scan          Past Surgical History:   Procedure Laterality Date    COLONOSCOPY  04/21/2014    Complete Colonoscopy    MR HEAD ANGIO WO IV CONTRAST  4/2/2012    MR HEAD ANGIO WO IV CONTRAST 4/2/2012 CON ANCILLARY LEGACY    OTHER SURGICAL HISTORY   03/06/2014    Bx Breast Percutan Needle Core Use Imag Guide (Stereotactic)        Social History  Social History     Tobacco Use    Smoking status: Never    Smokeless tobacco: Never   Vaping Use    Vaping Use: Never used   Substance Use Topics    Alcohol use: Never    Drug use: Never     Allergies  Patient has no known allergies.  (Not in a hospital admission)      Review of Systems   Constitutional:  Negative for activity change, appetite change, chills, diaphoresis, fatigue and fever.   HENT:  Negative for congestion, dental problem, drooling, ear discharge, sinus pressure, sinus pain, sneezing, sore throat, tinnitus, trouble swallowing and voice change.    Eyes:  Negative for pain, discharge, redness and itching.   Respiratory:  Negative for apnea, cough, choking, chest tightness, shortness of breath, wheezing and stridor.    Cardiovascular:  Negative for chest pain, palpitations and leg swelling.   Gastrointestinal:  Negative for abdominal distention, abdominal pain, anal bleeding, blood in stool, constipation, diarrhea, nausea, rectal pain and vomiting.   Endocrine: Negative for cold intolerance, heat intolerance and polydipsia.   Genitourinary:  Negative for difficulty urinating, dysuria, enuresis, flank pain, frequency and genital sores.   Musculoskeletal:  Negative for arthralgias, back pain, gait problem, joint swelling, myalgias, neck pain and neck stiffness.   Skin:  Negative for color change, pallor, rash and wound.   Allergic/Immunologic: Negative for environmental allergies, food allergies and immunocompromised state.   Neurological:  Positive for tremors. Negative for dizziness, seizures, facial asymmetry, speech difficulty, light-headedness, numbness and headaches.        Lips and mouth and eye blinking ? About Tardive Dyskinesia   Hematological:  Negative for adenopathy. Does not bruise/bleed easily.   Psychiatric/Behavioral:  Positive for behavioral problems. Negative for agitation, confusion,  decreased concentration, dysphoric mood, hallucinations, self-injury, sleep disturbance and suicidal ideas. The patient is not nervous/anxious and is not hyperactive.          Cardiovascular system: S1-S2 intact  Respiratory clear to auscultation bilateral on deep breathing he feels irritability on the left side of the chest.    Neurological Exam  Mental Status  Awake, alert and oriented to person, place and time. Recent and remote memory are intact. Able to copy figure. Clock drawing is normal. Speech is normal. Able to name objects, name parts of objects, repeat, read and write. Follows three-step commands. Able to perform serial calculations. Able to spell words backwards. Fund of knowledge is appropriate for level of education.    Cranial Nerves  CN II: Visual acuity is normal. Right funduscopic exam: disc intact. Left funduscopic exam: disc intact.  CN III, IV, VI: Extraocular movements intact bilaterally. Normal lids and orbits bilaterally.   Right pupil: Round. Reactive to light. Reactive to accommodation.  CN V:  Right: Facial sensation is normal.  Left: Facial sensation is normal on the left.  CN VII: Full and symmetric facial movement.  CN VIII:  Right: Hearing is normal.  Left: Hearing is normal.  CN IX, X:  Right: Palate is normal.  Left: Palate is normal.  CN XI:  Right: Sternocleidomastoid strength is normal.  Left: Sternocleidomastoid strength is normal.  CN XII: Tongue midline without atrophy or fasciculations.    Motor  Normal muscle bulk throughout. No fasciculations present. Increased muscle tone. The following abnormal movements were seen: Strength is 5/5 throughout all four extremities.  Parkinsonian features as before: Mild to moderate masklike face, mild cogwheel rigidity bilateral upper lower extremities, moderate resting tremors bilateral upper extremities, decreased arm swings, en bloc turning, very mild shuffling gait.    Sensory  Light touch is normal in upper and lower extremities.  "Pinprick is normal in upper and lower extremities. Temperature is normal in upper and lower extremities. Vibration is normal in upper and lower extremities.     Reflexes  Deep tendon reflexes are 2+ and symmetric except as noted.                                            Right                      Left  Achilles                                                        1+  Jaw jerk absent.  Right pathological reflexes: Kelli's absent.  Left pathological reflexes: Kelli's absent.  Glabellar tap absent. Snout absent. Right palmomental absent. Left palmomental absent. Right palmar grasp absent. Left palmar grasp absent.    Coordination  Right: Finger-to-nose normal. Rapid alternating movement normal. Heel-to-shin normal.Left: Finger-to-nose normal. Rapid alternating movement normal. Heel-to-shin normal.    Gait  Casual gait:  Mild shuffling gait.        Last Recorded Vitals  Blood pressure 150/84, pulse 93, height 1.702 m (5' 7\"), weight 90.7 kg (200 lb).    Relevant Results      Current Outpatient Medications:     amLODIPine (Norvasc) 5 mg tablet, TAKE 1 TABLET BY MOUTH EVERY DAY, Disp: 90 tablet, Rfl: 1    atorvastatin (Lipitor) 40 mg tablet, TAKE 1 TABLET BY MOUTH EVERY DAY, Disp: 90 tablet, Rfl: 1    clonazePAM (KlonoPIN) 0.5 mg tablet, Take 1 tablet (0.5 mg) by mouth once daily as needed., Disp: , Rfl:     desvenlafaxine 50 mg 24 hr tablet, Take 1 tablet (50 mg) by mouth once daily in the morning. Take before meals., Disp: , Rfl:     levothyroxine (Synthroid, Levoxyl) 75 mcg tablet, TAKE 1 TABLET BY MOUTH EVERY DAY, Disp: 90 tablet, Rfl: 3    meclizine (Antivert) 25 mg tablet, Take 1 tablet (25 mg) by mouth 3 times a day as needed for dizziness., Disp: 30 tablet, Rfl: 11    nystatin (Mycostatin) cream, Apply topically 2 times a day for 7 days. apply to affected area, Disp: 30 g, Rfl: 3    OLANZapine (ZyPREXA) 15 mg tablet, Take 2 tablets (30 mg) by mouth., Disp: , Rfl:     carbidopa-levodopa (Sinemet)  " mg tablet, Take 1 tablet by mouth 3 times a day. One pill at 7 AM, one pill at 1 PM and one pill at 5 PM., Disp: 270 tablet, Rfl: 3    glycopyrrolate (Robinul) 1 mg tablet, Take 0.5 tablets (0.5 mg) by mouth once daily at bedtime., Disp: 45 tablet, Rfl: 3     Continuous medications    PRN medications, reviewed                                        I have personally reviewed the following imaging for brain (CT/ MR) and results and discussed in detail with the patient/care giver/family     ECG 12 lead    Result Date: 12/7/2023  Normal sinus rhythm Possible Anterior infarct , age undetermined Abnormal ECG When compared with ECG of 04-OCT-2021 14:31, No significant change was found    CT head wo IV contrast    Result Date: 12/7/2023  Interpreted By:  Finkelstein, Evan, STUDY: CT HEAD WO IV CONTRAST;  12/7/2023 6:01 am   INDICATION: Signs/Symptoms:dizziness, falling.   COMPARISON: CT brain 10/18/2016   ACCESSION NUMBER(S): [id number]   ORDERING CLINICIAN: ORESTES ROSALES   TECHNIQUE: Axial noncontrast CT images of the head with coronal and sagittal reconstructions.   FINDINGS: EXTRACRANIAL SOFT TISSUES: Unremarkable.   CALVARIUM: No depressed skull fracture. No destructive osseous lesion. Remote right lamina papyracea fracture. Remote nasal bone fracture.   PARANASAL SINUSES/MASTOIDS: The visualized paranasal sinuses and mastoid air cells are aerated.   HEMORRHAGE: No acute intracranial hemorrhage.   BRAIN PARENCHYMA: Gray-white matter interfaces are preserved. No mass effect or midline shift.   VENTRICLES and EXTRA-AXIAL SPACES: Normal size.   OTHER FINDINGS: There are calcifications within the cavernous carotids       No acute intracranial hemorrhage, mass effect or midline shift.     MACRO: None.   Signed by: Evan Finkelstein 12/7/2023 6:09 AM Dictation workstation:   [id number]       Assessment/Plan     Parkinson disease is stable  Continue Sinemet 25/101 pill 3 times a day  New problem sialorrhea glycopyrrolate  0.5 mg at night    Patient/Family Education: Extensive time was spent educating the patient on relevant anatomy, clinical findings and imaging, as well as discussing the potential diagnoses as discussed above.  Pharmacology: as above. Exercise: I discussed the importance of maintaining a daily exercise program, including stretching and strengthening. Preventative strategies were reviewed, specifically avoidance of any exercises that exacerbate pain.Return to online virtual visit/ clinic visit for follow-up with me in one year  or sooner as needed.The patient expressed understanding and agreement with the assessment and plan.  Patient encouraged to contact us should they have any questions, concerns, or any changes in symptoms. Thank you for allowing me to participate in the care of your patient.** This note is created using speech recognition transcription software. Despite proofreading, several typographical errors might be present that might affect the meaning of the content. Please call with any questions.**          Vic Leary MD

## 2024-01-07 LAB
ATRIAL RATE: 100 BPM
P AXIS: 52 DEGREES
P OFFSET: 189 MS
P ONSET: 135 MS
PR INTERVAL: 184 MS
Q ONSET: 227 MS
QRS COUNT: 17 BEATS
QRS DURATION: 78 MS
QT INTERVAL: 354 MS
QTC CALCULATION(BAZETT): 456 MS
QTC FREDERICIA: 420 MS
R AXIS: -8 DEGREES
T AXIS: 74 DEGREES
T OFFSET: 404 MS
VENTRICULAR RATE: 100 BPM

## 2024-04-08 DIAGNOSIS — E78.5 HYPERLIPIDEMIA, UNSPECIFIED: ICD-10-CM

## 2024-04-08 DIAGNOSIS — I10 ESSENTIAL (PRIMARY) HYPERTENSION: ICD-10-CM

## 2024-04-08 DIAGNOSIS — E03.9 HYPOTHYROIDISM, UNSPECIFIED: ICD-10-CM

## 2024-04-08 RX ORDER — ATORVASTATIN CALCIUM 40 MG/1
40 TABLET, FILM COATED ORAL DAILY
Qty: 90 TABLET | Refills: 1 | Status: SHIPPED | OUTPATIENT
Start: 2024-04-08

## 2024-04-08 RX ORDER — AMLODIPINE BESYLATE 5 MG/1
5 TABLET ORAL DAILY
Qty: 90 TABLET | Refills: 1 | Status: SHIPPED | OUTPATIENT
Start: 2024-04-08

## 2024-04-08 RX ORDER — LEVOTHYROXINE SODIUM 75 UG/1
TABLET ORAL
Qty: 90 TABLET | Refills: 0 | Status: SHIPPED | OUTPATIENT
Start: 2024-04-08 | End: 2024-06-03

## 2024-05-30 DIAGNOSIS — E03.9 HYPOTHYROIDISM, UNSPECIFIED: ICD-10-CM

## 2024-06-03 RX ORDER — LEVOTHYROXINE SODIUM 75 UG/1
TABLET ORAL
Qty: 90 TABLET | Refills: 0 | Status: SHIPPED | OUTPATIENT
Start: 2024-06-03

## 2024-09-26 DIAGNOSIS — K11.7 SIALORRHEA: ICD-10-CM

## 2024-09-26 RX ORDER — GLYCOPYRROLATE 1 MG/1
TABLET ORAL
Qty: 45 TABLET | Refills: 3 | Status: SHIPPED | OUTPATIENT
Start: 2024-09-26

## 2024-09-28 DIAGNOSIS — E78.5 HYPERLIPIDEMIA, UNSPECIFIED: ICD-10-CM

## 2024-09-28 DIAGNOSIS — I10 ESSENTIAL (PRIMARY) HYPERTENSION: ICD-10-CM

## 2024-09-29 DIAGNOSIS — E03.9 HYPOTHYROIDISM, UNSPECIFIED: ICD-10-CM

## 2024-09-30 RX ORDER — LEVOTHYROXINE SODIUM 75 UG/1
TABLET ORAL
Qty: 90 TABLET | Refills: 0 | Status: SHIPPED | OUTPATIENT
Start: 2024-09-30

## 2024-10-02 RX ORDER — AMLODIPINE BESYLATE 5 MG/1
5 TABLET ORAL DAILY
Qty: 90 TABLET | Refills: 0 | Status: SHIPPED | OUTPATIENT
Start: 2024-10-02

## 2024-10-02 RX ORDER — ATORVASTATIN CALCIUM 40 MG/1
40 TABLET, FILM COATED ORAL DAILY
Qty: 90 TABLET | Refills: 0 | Status: SHIPPED | OUTPATIENT
Start: 2024-10-02

## 2024-12-26 DIAGNOSIS — E03.9 HYPOTHYROIDISM, UNSPECIFIED: ICD-10-CM

## 2024-12-26 RX ORDER — LEVOTHYROXINE SODIUM 75 UG/1
TABLET ORAL
Qty: 90 TABLET | Refills: 0 | OUTPATIENT
Start: 2024-12-26

## 2024-12-26 RX ORDER — LEVOTHYROXINE SODIUM 75 UG/1
75 TABLET ORAL DAILY
Qty: 90 TABLET | Refills: 0 | Status: SHIPPED | OUTPATIENT
Start: 2024-12-26

## 2024-12-28 DIAGNOSIS — E78.5 HYPERLIPIDEMIA, UNSPECIFIED: ICD-10-CM

## 2024-12-28 DIAGNOSIS — I10 ESSENTIAL (PRIMARY) HYPERTENSION: ICD-10-CM

## 2024-12-30 RX ORDER — AMLODIPINE BESYLATE 5 MG/1
5 TABLET ORAL DAILY
Qty: 90 TABLET | Refills: 0 | Status: SHIPPED | OUTPATIENT
Start: 2024-12-30

## 2024-12-30 RX ORDER — ATORVASTATIN CALCIUM 40 MG/1
40 TABLET, FILM COATED ORAL DAILY
Qty: 90 TABLET | Refills: 0 | Status: SHIPPED | OUTPATIENT
Start: 2024-12-30

## 2025-01-06 ENCOUNTER — APPOINTMENT (OUTPATIENT)
Dept: PRIMARY CARE | Facility: CLINIC | Age: 75
End: 2025-01-06
Payer: MEDICARE

## 2025-01-06 VITALS
SYSTOLIC BLOOD PRESSURE: 140 MMHG | HEART RATE: 101 BPM | TEMPERATURE: 97.8 F | OXYGEN SATURATION: 96 % | WEIGHT: 200 LBS | BODY MASS INDEX: 31.32 KG/M2 | DIASTOLIC BLOOD PRESSURE: 68 MMHG

## 2025-01-06 DIAGNOSIS — E78.5 HYPERLIPIDEMIA, UNSPECIFIED: ICD-10-CM

## 2025-01-06 DIAGNOSIS — L30.4 INTERTRIGO: ICD-10-CM

## 2025-01-06 DIAGNOSIS — E03.9 HYPOTHYROIDISM, UNSPECIFIED: ICD-10-CM

## 2025-01-06 DIAGNOSIS — I10 ESSENTIAL (PRIMARY) HYPERTENSION: Primary | ICD-10-CM

## 2025-01-06 PROCEDURE — 99214 OFFICE O/P EST MOD 30 MIN: CPT | Performed by: FAMILY MEDICINE

## 2025-01-06 PROCEDURE — 3077F SYST BP >= 140 MM HG: CPT | Performed by: FAMILY MEDICINE

## 2025-01-06 PROCEDURE — 1123F ACP DISCUSS/DSCN MKR DOCD: CPT | Performed by: FAMILY MEDICINE

## 2025-01-06 PROCEDURE — 1159F MED LIST DOCD IN RCRD: CPT | Performed by: FAMILY MEDICINE

## 2025-01-06 PROCEDURE — 1036F TOBACCO NON-USER: CPT | Performed by: FAMILY MEDICINE

## 2025-01-06 PROCEDURE — 3078F DIAST BP <80 MM HG: CPT | Performed by: FAMILY MEDICINE

## 2025-01-06 RX ORDER — OLANZAPINE 20 MG/1
20 TABLET ORAL NIGHTLY
COMMUNITY
Start: 2024-11-17

## 2025-01-06 RX ORDER — OLANZAPINE 10 MG/1
10 TABLET ORAL NIGHTLY
COMMUNITY
Start: 2024-10-24

## 2025-01-06 RX ORDER — ATORVASTATIN CALCIUM 40 MG/1
40 TABLET, FILM COATED ORAL DAILY
Qty: 90 TABLET | Refills: 3 | Status: SHIPPED | OUTPATIENT
Start: 2025-01-06

## 2025-01-06 RX ORDER — DESVENLAFAXINE SUCCINATE 25 MG/1
1 TABLET, EXTENDED RELEASE ORAL
COMMUNITY
Start: 2024-10-24

## 2025-01-06 RX ORDER — NYSTATIN 100000 U/G
CREAM TOPICAL 2 TIMES DAILY
Qty: 30 G | Refills: 3 | Status: SHIPPED | OUTPATIENT
Start: 2025-01-06

## 2025-01-06 RX ORDER — NYSTATIN 100000 U/G
CREAM TOPICAL 2 TIMES DAILY
COMMUNITY
End: 2025-01-06 | Stop reason: SDUPTHER

## 2025-01-06 RX ORDER — DESVENLAFAXINE 100 MG/1
1 TABLET, EXTENDED RELEASE ORAL
COMMUNITY
Start: 2024-12-24

## 2025-01-06 RX ORDER — LEVOTHYROXINE SODIUM 75 UG/1
75 TABLET ORAL DAILY
Qty: 90 TABLET | Refills: 3 | Status: SHIPPED | OUTPATIENT
Start: 2025-01-06

## 2025-01-06 RX ORDER — AMLODIPINE BESYLATE 5 MG/1
5 TABLET ORAL DAILY
Qty: 90 TABLET | Refills: 3 | Status: SHIPPED | OUTPATIENT
Start: 2025-01-06

## 2025-01-06 ASSESSMENT — ENCOUNTER SYMPTOMS
PALPITATIONS: 0
CONFUSION: 1
SINUS PRESSURE: 0
NUMBNESS: 0
DIARRHEA: 0
ABDOMINAL PAIN: 0
COUGH: 0
BLOOD IN STOOL: 0
FLANK PAIN: 0
WEAKNESS: 0
MYALGIAS: 0
DIZZINESS: 0
FEVER: 0
VOMITING: 0
JOINT SWELLING: 0
HEADACHES: 0
LIGHT-HEADEDNESS: 0
HEMATURIA: 0
DYSURIA: 0
ARTHRALGIAS: 0
SHORTNESS OF BREATH: 0
DECREASED CONCENTRATION: 1
UNEXPECTED WEIGHT CHANGE: 0
EYE ITCHING: 0
CONSTIPATION: 0
ACTIVITY CHANGE: 0
RHINORRHEA: 0
NERVOUS/ANXIOUS: 0
WHEEZING: 0
NAUSEA: 0
DYSPHORIC MOOD: 0
EYE DISCHARGE: 0
SORE THROAT: 0
APPETITE CHANGE: 0
SLEEP DISTURBANCE: 0

## 2025-01-06 NOTE — PROGRESS NOTES
Subjective   Patient ID: Blossom Aguilar is a 74 y.o. female who presents for Med Refill.    HPI PATIENT WITH STABLE HYPERTENSION   SHE HAS HYPERLIPIDEMIA   NO LABS IN SOME TIME 12-23   LIPIDS NORMAL THEN   ABNORMAL COLOGUARD IN 22 AND HAD SCOPE AND THE BIOPSIES WERE BENIGN   SEES NEUROLOGY    Review of Systems   Constitutional:  Negative for activity change, appetite change, fever and unexpected weight change.   HENT:  Negative for congestion, ear pain, postnasal drip, rhinorrhea, sinus pressure and sore throat.    Eyes:  Negative for discharge, itching and visual disturbance.   Respiratory:  Negative for cough, shortness of breath and wheezing.    Cardiovascular:  Negative for chest pain, palpitations and leg swelling.   Gastrointestinal:  Negative for abdominal pain, blood in stool, constipation, diarrhea, nausea and vomiting.   Endocrine: Negative for cold intolerance, heat intolerance and polyuria.   Genitourinary:  Negative for dysuria, flank pain and hematuria.   Musculoskeletal:  Negative for arthralgias, gait problem, joint swelling and myalgias.   Skin:  Negative for rash.   Allergic/Immunologic: Negative for environmental allergies and food allergies.   Neurological:  Negative for dizziness, syncope, weakness, light-headedness, numbness and headaches.   Psychiatric/Behavioral:  Positive for confusion and decreased concentration. Negative for dysphoric mood and sleep disturbance. The patient is not nervous/anxious.        Objective   /68   Pulse 101   Temp 36.6 °C (97.8 °F)   Wt 90.7 kg (200 lb)   SpO2 96%   BMI 31.32 kg/m²     Physical Exam  Vitals and nursing note reviewed.   Constitutional:       Appearance: Normal appearance.   HENT:      Head: Normocephalic.   Cardiovascular:      Rate and Rhythm: Normal rate and regular rhythm.      Pulses: Normal pulses.      Heart sounds: Normal heart sounds. No murmur heard.     No friction rub. No gallop.   Pulmonary:      Effort: Pulmonary effort is  normal. No respiratory distress.      Breath sounds: Normal breath sounds. No wheezing.   Abdominal:      General: There is no distension.      Palpations: Abdomen is soft.      Tenderness: There is no abdominal tenderness.   Musculoskeletal:         General: No deformity. Normal range of motion.   Skin:     General: Skin is warm and dry.      Capillary Refill: Capillary refill takes less than 2 seconds.      Findings: Rash present.      Comments: UNDER BREASTS    Neurological:      General: No focal deficit present.      Mental Status: She is alert and oriented to person, place, and time.   Psychiatric:         Mood and Affect: Mood normal.         Assessment/Plan   Problem List Items Addressed This Visit             ICD-10-CM    Intertrigo L30.4    Relevant Medications    nystatin (Mycostatin) cream    Essential (primary) hypertension - Primary I10    Relevant Medications    amLODIPine (Norvasc) 5 mg tablet    Hyperlipidemia, unspecified E78.5    Relevant Medications    atorvastatin (Lipitor) 40 mg tablet    Hypothyroidism, unspecified E03.9    Relevant Medications    levothyroxine (Synthroid, Levoxyl) 75 mcg tablet

## 2025-03-09 DIAGNOSIS — G20.A1 PARKINSON'S DISEASE WITHOUT DYSKINESIA OR FLUCTUATING MANIFESTATIONS: ICD-10-CM

## 2025-03-10 RX ORDER — CARBIDOPA AND LEVODOPA 25; 100 MG/1; MG/1
TABLET ORAL
Qty: 270 TABLET | Refills: 3 | Status: SHIPPED | OUTPATIENT
Start: 2025-03-10

## 2025-03-17 NOTE — PROGRESS NOTES
"Date of Service: 3/20/2025  Patient: Blossom Aguilar  MRN: [id number]  Referring Provider: No ref. provider found  PCP: Ashia Galarza DO    History of Present Illness:   Ms. Aguilar is a 74 y.o. female who presents to neurology clinic for evaluation of parkinson's disease. Blossom Aguilar's past medical history is pertinent for HTN, HLD, hypothyroidism, depression on desvenlafaxine and olanzapine..    She was diagnosed with parkinson's disease around 2018. At that time she was having shuffling gait and right upper extremity rest tremor. She is on sinemet 25-100mg TID and has noticed improvement in tremor and gait.    Motor Symptoms:  Rest Tremor: moderate R hand rest tremor, mild L hand rest tremor  Action Tremor: bilateral upper extremity action tremor  Bradykinesia: Yes  Rigidity: bilateral lower extremity  Postural instability:  Falls: a few weeks ago, became dizzy with mucinex. No injuries  Impaired Balance: mildly impaired  Hypomimia (masked facial expression): yes  Decreased eye blinking: Yes  Speech disturbances (hypokinetic dysarthria, hypophonia): No  Dysphagia: No  Sialorrhea: at night when sleeping  Micrographia: yes  Dystonia: No  Stooped posture: No    Gait  Shuffling, short-stepped gait: Yes  Freezing: No  Festination: No    Non Motor Symptoms  Hyposmia: No  Cognitive: \"Pretty good\"  Insomnia/RBD: Talks in sleep. No movements  Constipation: No    Current Medications  Current:   -carbidopa/levodopa 25/100 1 tab three times a day (7:30am , 1pm, 6pm)  -Glycopyrrolate 0.5mg daily for drooling  SE: Denies hallucinations, dyskinesia, impulse control, sudden sleep, edema  Time to turn on: Does not notice  Wearing off: No  Previously tried: None    Review of Systems:  The systems were reviewed with pertinent positives and negatives documented in the HPI.      Past Medical & Surgical History  Past Medical History:   Diagnosis Date    Benign neoplasm of left breast 03/06/2014    Benign neoplasm of breast, left "    Depression     Encounter for screening mammogram for malignant neoplasm of breast     Visit for screening mammogram    Hypertension     Mammographic calcification found on diagnostic imaging of breast 02/13/2014    Breast calcifications    Other abnormal and inconclusive findings on diagnostic imaging of breast 02/13/2014    Abnormal mammogram    Parkinsons (Multi)     Personal history of other diseases of the respiratory system 05/30/2017    History of acute bronchitis    Personal history of other endocrine, nutritional and metabolic disease 05/20/2021    History of vitamin D deficiency    Personal history of other medical treatment     History of bone scan     Past Surgical History:   Procedure Laterality Date    COLONOSCOPY  04/21/2014    Complete Colonoscopy    MR HEAD ANGIO WO IV CONTRAST  4/2/2012    MR HEAD ANGIO WO IV CONTRAST 4/2/2012 CON ANCILLARY LEGACY    OTHER SURGICAL HISTORY  03/06/2014    Bx Breast Percutan Needle Core Use Imag Guide (Stereotactic)       Social History:   Social History     Tobacco Use    Smoking status: Never    Smokeless tobacco: Never   Substance Use Topics    Alcohol use: Never     Family History:   No known family history of parkinson's disease     Medications:     Current Outpatient Medications:     amLODIPine (Norvasc) 5 mg tablet, Take 1 tablet (5 mg) by mouth once daily., Disp: 90 tablet, Rfl: 3    atorvastatin (Lipitor) 40 mg tablet, Take 1 tablet (40 mg) by mouth once daily., Disp: 90 tablet, Rfl: 3    carbidopa-levodopa (Sinemet)  mg tablet, TAKE 1 TABLET BY MOUTH 3 TIMES A DAY. ONE TAB AT 7 AM, ONE TAB AT 1 PM AND ONE TAB AT 5 PM., Disp: 270 tablet, Rfl: 3    clonazePAM (KlonoPIN) 0.5 mg tablet, Take 1 tablet (0.5 mg) by mouth once daily as needed., Disp: , Rfl:     desvenlafaxine 100 mg 24 hr tablet, Take 1 tablet (100 mg) by mouth early in the morning.., Disp: , Rfl:     desvenlafaxine succinate (Pristiq) 25 mg 24 hour tablet, Take 1 tablet (25 mg) by mouth  early in the morning.., Disp: , Rfl:     glycopyrrolate (Robinul) 1 mg tablet, TAKE 1/2 TABLET (0.5 MG) BY MOUTH ONCE DAILY AT BEDTIME., Disp: 45 tablet, Rfl: 3    levothyroxine (Synthroid, Levoxyl) 75 mcg tablet, Take 1 tablet (75 mcg) by mouth once daily. Take on an empty stomach at the same time each day, either 30 to 60 minutes prior to breakfast, Disp: 90 tablet, Rfl: 3    nystatin (Mycostatin) cream, Apply topically 2 times a day., Disp: 30 g, Rfl: 3    OLANZapine (ZyPREXA) 10 mg tablet, Take 1 tablet (10 mg) by mouth once daily at bedtime., Disp: , Rfl:     OLANZapine (ZyPREXA) 20 mg tablet, Take 1 tablet (20 mg) by mouth once daily at bedtime., Disp: , Rfl:      General Physical Exam:  There were no vitals taken for this visit.     She looks well and is not in any acute distress. Breathing comfortably on room air.     Neurological Exam:   Mental status reveals her to be alert and oriented. Speech is intact to conversation.     Hypomimia and decreased blink rate     Cranial nerves:  CN 2   Visual fields full to confrontation.   CN 3, 4, 6   Pupils round, 4 mm in diameter, equally reactive to light. Lids symmetric; no ptosis. EOMs normal alignment, full range.   No nystagmus.   CN 5   Facial sensation intact bilaterally.   CN 7   Normal and symmetric facial strength. Nasolabial folds symmetric.   CN 8   Hearing intact to conversation.   CN 9   Palate elevates symmetrically.   CN 11   Normal strength of shoulder shrug  CN 12   Tongue midline     Motor:  Muscle bulk: Normal throughout.  Muscle tone: Mildly increased tone in bilateral upper extremities.  Movements: moderate R hand rest tremor, mild L hand rest tremor, bilateral postural and action tremor    R L   5 5 Shoulder abduction  5 5 Elbow flexion  5 5 Elbow extension  5 5 Finger abduction  5 5 Hip flexion  5 5 Knee flexion  5 5 Knee extension  5 5 Ankle dorsiflexion  5 5 Ankle plantarflexion     Reflexes                         R     L  Triceps          1       1  Biceps           1      1  Brachiorad    1      1  Patellar         0      0   Achilles         0      0     Sensory:   Light Touch: Normal in all extremities     Coordination:  In both upper extremities, finger-nose-finger without dysmetria or overshoot. Bilateral action tremor  In both lower extremities, heel-to-shin was intact. XI were bradykinetic, decrement in finger taps bilaterally    Gait:  Gait was shuffling and very bradykinetic. Decreased left arm swing. Mild en bloc turning.    Results:    Lab Results   Component Value Date    HGBA1C 6.2 (A) 06/17/2022     Lab Results   Component Value Date    TSH 1.59 06/22/2023      CBC:   Lab Results   Component Value Date    WBC 6.6 12/07/2023    HGB 14.5 12/07/2023    HCT 44.0 12/07/2023     12/07/2023     BMP:   Lab Results   Component Value Date     12/07/2023    K 3.8 12/07/2023     12/07/2023    CO2 28 12/07/2023    BUN 13 12/07/2023    CREATININE 0.86 12/07/2023    CALCIUM 9.9 12/07/2023    PHOS 3.1 12/07/2023     LFT:   Lab Results   Component Value Date    ALKPHOS 81 06/22/2023    BILITOT 1.3 (H) 06/22/2023    PROT 6.9 06/22/2023    ALBUMIN 4.4 06/22/2023    ALT 56 (H) 06/22/2023    AST 32 06/22/2023     Imaging:  MRI brain (2012)    IMPRESSION:  Mild parenchymal volume loss with minimal small vessel ischemic change    Impression:  Blossom Aguilar is a 74 y.o. who presents for follow up parkinson's disease. On exam she has rest tremor LUE>RUE. Significant bradykinesia and shuffling gait. Of note, she is on long term olanzapine for bipolar disorder which can cause parkinsonism; however, symptoms are asymmetric and reportedly improved with sinemet. Will plan to increase sinemet dosing. She also has chronic sialorrhea that has improved on glycopyrrolate and will continue.    Plan:  Parkinson's Disease  -Increase sinemet 25-100mg 1.5 tablets TID for a month, Then 2 tablets TID.  -Continue glycopyrrolate 0.5mg daily for sialorrhea      Reviewed and approved by KATHLEEN DE JESUS on 3/17/25 at 3:19 PM.    I personally spent 44 minutes on the day of the visit completing the review of the medical record and outside records, obtaining history and performing an appropriate physical exam, patient care, counseling and education, placing orders, independently reviewing results, communicating with the patient/family and other providers, coordinating care and performing appropriate clinical documentation.

## 2025-03-19 ENCOUNTER — APPOINTMENT (OUTPATIENT)
Dept: NEUROLOGY | Facility: CLINIC | Age: 75
End: 2025-03-19
Payer: MEDICARE

## 2025-03-20 ENCOUNTER — APPOINTMENT (OUTPATIENT)
Dept: NEUROLOGY | Facility: CLINIC | Age: 75
End: 2025-03-20
Payer: MEDICARE

## 2025-03-20 VITALS
HEIGHT: 67 IN | BODY MASS INDEX: 30.45 KG/M2 | HEART RATE: 95 BPM | WEIGHT: 194 LBS | DIASTOLIC BLOOD PRESSURE: 86 MMHG | SYSTOLIC BLOOD PRESSURE: 130 MMHG | TEMPERATURE: 96.8 F

## 2025-03-20 DIAGNOSIS — G20.A1 PARKINSON'S DISEASE WITHOUT DYSKINESIA OR FLUCTUATING MANIFESTATIONS: Primary | ICD-10-CM

## 2025-03-20 DIAGNOSIS — K11.7 SIALORRHEA: ICD-10-CM

## 2025-03-20 PROCEDURE — 1126F AMNT PAIN NOTED NONE PRSNT: CPT | Performed by: STUDENT IN AN ORGANIZED HEALTH CARE EDUCATION/TRAINING PROGRAM

## 2025-03-20 PROCEDURE — 3008F BODY MASS INDEX DOCD: CPT | Performed by: STUDENT IN AN ORGANIZED HEALTH CARE EDUCATION/TRAINING PROGRAM

## 2025-03-20 PROCEDURE — 99215 OFFICE O/P EST HI 40 MIN: CPT | Performed by: STUDENT IN AN ORGANIZED HEALTH CARE EDUCATION/TRAINING PROGRAM

## 2025-03-20 PROCEDURE — 3079F DIAST BP 80-89 MM HG: CPT | Performed by: STUDENT IN AN ORGANIZED HEALTH CARE EDUCATION/TRAINING PROGRAM

## 2025-03-20 PROCEDURE — 1159F MED LIST DOCD IN RCRD: CPT | Performed by: STUDENT IN AN ORGANIZED HEALTH CARE EDUCATION/TRAINING PROGRAM

## 2025-03-20 PROCEDURE — 1123F ACP DISCUSS/DSCN MKR DOCD: CPT | Performed by: STUDENT IN AN ORGANIZED HEALTH CARE EDUCATION/TRAINING PROGRAM

## 2025-03-20 PROCEDURE — 3075F SYST BP GE 130 - 139MM HG: CPT | Performed by: STUDENT IN AN ORGANIZED HEALTH CARE EDUCATION/TRAINING PROGRAM

## 2025-03-20 PROCEDURE — 1036F TOBACCO NON-USER: CPT | Performed by: STUDENT IN AN ORGANIZED HEALTH CARE EDUCATION/TRAINING PROGRAM

## 2025-03-20 PROCEDURE — 1160F RVW MEDS BY RX/DR IN RCRD: CPT | Performed by: STUDENT IN AN ORGANIZED HEALTH CARE EDUCATION/TRAINING PROGRAM

## 2025-03-20 RX ORDER — CARBIDOPA AND LEVODOPA 25; 100 MG/1; MG/1
2 TABLET ORAL 3 TIMES DAILY
Qty: 540 TABLET | Refills: 3 | Status: SHIPPED | OUTPATIENT
Start: 2025-03-20 | End: 2026-03-20

## 2025-03-20 ASSESSMENT — PAIN SCALES - GENERAL: PAINLEVEL_OUTOF10: 0-NO PAIN

## 2025-03-20 NOTE — PATIENT INSTRUCTIONS
You were diagnosed with Parkinson's disease which is a slowly progressive disorder caused by loss of dopamine producing cells in the brain. Early symptoms commonly include slowness/dyscoordination, stiffness, balance problems, and tremor. There is no cure, but there are good symptomatic treatments.    Increase your carbidopa/levodopa medicine to 1.5 pills three times a day for a month  Then 2 pills three times a day.    Try to take it 30 minutes after meals. Side effects include dizziness, confusion, hallucinations, drowsiness, wiggly movements, and upset stomach.    Download the Parkinson and Movement Disorder [place] [employer] on your smart phone: Look up PD&me in the [employer] store  With PD&Me, you can:  ·         Find support groups near you  ·         Find exercise and wellness classes near you (exercise, art, music, dance)  ·         Find virtual groups you can access from anywhere  ·         Find information about medication assistance, educational programs, and web site resources    For more information, please check:    National Parkinson [employer], Inc[employer]  <http://www.parkinson.org>    The American Parkinson Disease Association  <http://www.apdaparkinson.org>    The [employer] for  Parkinson’s Research  <[email or web address]>

## 2025-06-02 NOTE — PROGRESS NOTES
"[facility]    SUBJECTIVE    Blossom Aguilar is a 74 y.o. right-handed  right handed female with a past medical history of HTN, HLD, hypothyroidism, and depression who presents with   Chief Complaint   Patient presents with    Follow-up     Follow up on parkinsons- states she thinks she is doing a little better. No worsening symptoms. Takes sinnemet 3 times a day         Presents for follow up visit  Visit type: in-clinic visit    HISTORY OF PRESENT ILLNESS    RECAP:  Former patient of Dr. Matamoros, last seen in March 2025. She was diagnosed with parkinson's disease around 2018. At that time she was having shuffling gait and right upper extremity rest tremor. She was increased to sinemet 25-100mg 1.5 tablets TID. She has chronic sialorrhea that has improved on glycopyrrolate.     06/05/25 -  Since increasing sinemet to  mg 1.5 tablets TID, she reports resting tremor has improved. Action tremor is about the same. She uses her stationary bike for 20 minutes every day.    Motor Symptoms:  Rest Tremor: moderate R hand rest tremor, mild L hand rest tremor  Action Tremor: bilateral upper extremity action tremor  Bradykinesia: Yes  Rigidity: bilateral lower extremity  Postural instability: no  Falls: no recent falls  Impaired Balance: mildly impaired  Hypomimia (masked facial expression): yes  Decreased eye blinking: Yes  Speech disturbances (hypokinetic dysarthria, hypophonia): No  Dysphagia: No  Sialorrhea: at night when sleeping  Micrographia: yes  Dystonia: No  Stooped posture: No     Gait  Shuffling, short-stepped gait: Yes  Freezing: No  Festination: No     Non Motor Symptoms  Hyposmia: No  Cognitive: \"Pretty good\"  Insomnia/RBD: Mumbles in sleep. No movements  Constipation: No     Current Medications  Current:   -carbidopa/levodopa 25/100 1.5 tab three times a day (7:30am , 1pm, 6pm)  -Glycopyrrolate 0.5mg daily for drooling  SE: Denies hallucinations, dyskinesia, impulse control, sudden " "sleep, edema  Time to turn on: Does not notice  Wearing off: No  Previously tried: None    REVIEW OF SYSTEMS:  10 point review of systems performed and is negative except as noted in the HPI.     Medical History[1]    Family medical history includes dementia in mother and stroke in sister.    Surgical History[2]    Social History     Substance and Sexual Activity   Drug Use Never     Tobacco Use: Low Risk  (6/5/2025)    Patient History     Smoking Tobacco Use: Never     Smokeless Tobacco Use: Never     Passive Exposure: Not on file     Alcohol Use: Low Risk  (3/31/2023)    Received from [facility]    PCARE Alcohol SDOH     PCare Alcohol SDOH: Not on file     Alcohol: No     PCare Alcohol SDOH: Not on file       Current Outpatient Medications   Medication Instructions    amLODIPine (NORVASC) 5 mg, oral, Daily    atorvastatin (LIPITOR) 40 mg, oral, Daily    carbidopa-levodopa (Sinemet)  mg tablet 2 tablets, oral, 3 times daily    clonazePAM (KLONOPIN) 0.5 mg, Daily PRN    desvenlafaxine 100 mg 24 hr tablet 1 tablet, Daily (0630)    desvenlafaxine succinate (Pristiq) 25 mg 24 hour tablet 1 tablet, Daily (0630)    glycopyrrolate (Robinul) 1 mg tablet TAKE 1/2 TABLET (0.5 MG) BY MOUTH ONCE DAILY AT BEDTIME.    levothyroxine (SYNTHROID, LEVOXYL) 75 mcg, oral, Daily, Take on an empty stomach at the same time each day, either 30 to 60 minutes prior to breakfast    nystatin (Mycostatin) cream Topical, 2 times daily    OLANZapine (ZYPREXA) 20 mg, Nightly    OLANZapine (ZYPREXA) 10 mg, Nightly      OBJECTIVE    /64 (BP Location: Left arm, Patient Position: Sitting, BP Cuff Size: Adult)   Pulse 109   Temp 36.1 °C (97 °F) (Temporal)   Ht 1.702 m (5' 7\")   Wt 90.3 kg (199 lb)   BMI 31.17 kg/m²     Physical Exam  Vitals reviewed.   Constitutional:       Appearance: Normal appearance.   Eyes:      General: Lids are normal.      Extraocular Movements: Extraocular movements intact.      Pupils: Pupils " are equal, round, and reactive to light.   Neurological:      Mental Status: She is oriented to person, place, and time.      Cranial Nerves: Cranial nerves 2-12 are intact.      Sensory: Sensation is intact.      Motor: Tremor and abnormal muscle tone present.      Coordination: Impaired rapid alternating movements.      Gait: Gait abnormal.      Deep Tendon Reflexes:      Reflex Scores:       Bicep reflexes are 2+ on the right side and 2+ on the left side.       Brachioradialis reflexes are 2+ on the right side and 2+ on the left side.       Patellar reflexes are 2+ on the right side and 2+ on the left side.  Psychiatric:         Attention and Perception: Attention and perception normal.         Mood and Affect: Mood and affect normal.         Speech: Speech normal.         Behavior: Behavior normal. Behavior is cooperative.         Thought Content: Thought content normal.         Cognition and Memory: Cognition and memory normal.   Neurological Exam  Mental Status  Awake, alert and oriented to person, place and time. Oriented to person, place, and time. Memory is normal. Recent and remote memory are intact. Speech is normal. Language is fluent with no aphasia. Attention and concentration are normal. Fund of knowledge is appropriate for level of education.    Cranial Nerves  CN II: Visual fields full to confrontation.  CN III, IV, VI: Extraocular movements intact bilaterally. Normal lids and orbits bilaterally. Pupils equal round and reactive to light bilaterally.  CN V: Facial sensation is normal.  CN VII: Full and symmetric facial movement.  CN VIII: Hearing is normal.  CN IX, X: Palate elevates symmetrically  CN XI: Shoulder shrug strength is normal.  CN XII: Tongue midline without atrophy or fasciculations.    Motor  Normal muscle bulk throughout. No fasciculations present. Increased muscle tone. Mildly increased tone of the bilateral upper and lower extremities. The following abnormal movements were seen:  Bilateral R>L upper extremity rest and action tremor.   Strength is 5/5 in all four extremities except as noted.  4/5 strength of the RUE.    Sensory  Normal sensation.Light touch is normal in upper and lower extremities.     Reflexes                                            Right                      Left  Brachioradialis                    2+                         2+  Biceps                                 2+                         2+  Patellar                                2+                         2+    Coordination   Tremors.Right: Finger-to-nose abnormality: Rapid alternating movement abnormality:Left: Finger-to-nose abnormality: Rapid alternating movement abnormality:  Mild tremor with finger to nose bilaterally. Bradykinesia with bilateral finger tapping and toe tapping.    Gait   Abnormal gait.Casual gait: Reduced stride length. Shuffling gait. Reduced right arm swing. Reduced left arm swing. Unable to rise from chair without using arms.  Bradykinetic gait with mild shuffling, absent arm swing bilaterally.    CT head wo IV contrast 12/07/2023    Narrative  Interpreted By:  Finkelstein, Evan,  STUDY:  CT HEAD WO IV CONTRAST;  12/7/2023 6:01 am    INDICATION:  Signs/Symptoms:dizziness, falling.    COMPARISON:  CT brain 10/18/2016    ACCESSION NUMBER(S):  [id number]    ORDERING CLINICIAN:  ORESTES ROSALES    TECHNIQUE:  Axial noncontrast CT images of the head with coronal and sagittal  reconstructions.    FINDINGS:  EXTRACRANIAL SOFT TISSUES: Unremarkable.    CALVARIUM: No depressed skull fracture. No destructive osseous  lesion. Remote right lamina papyracea fracture. Remote nasal bone  fracture.    PARANASAL SINUSES/MASTOIDS: The visualized paranasal sinuses and  mastoid air cells are aerated.    HEMORRHAGE: No acute intracranial hemorrhage.    BRAIN PARENCHYMA: Gray-white matter interfaces are preserved. No mass  effect or midline shift.    VENTRICLES and EXTRA-AXIAL SPACES: Normal size.    OTHER  FINDINGS: There are calcifications within the cavernous carotids    Impression  No acute intracranial hemorrhage, mass effect or midline shift.      MACRO:  None.    Signed by: Evan Finkelstein 12/7/2023 6:09 AM  Dictation workstation:   [id number]      Lab Results   Component Value Date    WBC 6.6 12/07/2023    RBC 4.60 12/07/2023    HGB 14.5 12/07/2023    HCT 44.0 12/07/2023     12/07/2023     12/07/2023    K 3.8 12/07/2023     12/07/2023    BUN 13 12/07/2023    CREATININE 0.86 12/07/2023    EGFR 72 12/07/2023    CALCIUM 9.9 12/07/2023    ALKPHOS 81 06/22/2023    AST 32 06/22/2023    ALT 56 (H) 06/22/2023    VITD25 29 (A) 06/22/2023    HGBA1C 6.2 (A) 06/17/2022    CHOL 136 06/22/2023    HDL 62.4 06/22/2023    TRIG 95 06/22/2023    TSH 1.59 06/22/2023      ASSESSMENT & PLAN  Problem List Items Addressed This Visit    None  Visit Diagnoses         Parkinson's disease without dyskinesia or fluctuating manifestations    -  Primary      Sialorrhea            Blossom is a 74-year-old female with a history of HTN, HLD, hypothyroidism, Parkinson's disease, and depression who presents for Parkinson's disease follow up. She is a former patient of Dr. Matamoros, last seen in March 2025. At this time Sinemet was increased to  mg 1.5 tablets TID.     Patient noticed improvement with rest tremor and gait on increased dosing. Action tremor has remained about the same. Glycopyrrolate continues to help with nightly sialorrhea. She uses her stationary bike for 20 minutes daily. She is tolerating increased Sinemet dosing and denies side effects.     Neurological exam is significant for bilateral R>L rest and action tremor of the upper extremities, mildly increased tone of the bilateral upper and lower extremities, bradykinetic gait with mild shuffling and decreased arm swing, and slowed XI of the bilateral upper and lower extremities. Of note, she is on long term olanzapine for bipolar disorder which can  cause parkinsonism; however, symptoms are asymmetric and reportedly improved with sinemet. We will increase Sinemet to  mg 2 tablets, TID. We will continue glycopyrrolate 0.5 mg nightly for sialorrhea. I have encouraged her to keep using her stationary bike daily and provided a hand out for beneficial exercised for Parkinson's Disease.    PLAN:  - Increase Sinemet to  mg 2 tablets, TID  - Continue glycopyrrolate 0.5 mg nightly for sialorrhea  - Follow up in 6 months     I personally spent 107 minutes today, exclusive of procedures, providing care for this patient, including preparation, face to face time, documentation and other services such as review of medical records, diagnostic result, patient education, counseling, coordination of care as specified in the encounter.     Miladis Wiggins PA-C           [1]   Past Medical History:  Diagnosis Date    Benign neoplasm of left breast 03/06/2014    Benign neoplasm of breast, left    Depression     Encounter for screening mammogram for malignant neoplasm of breast     Visit for screening mammogram    Hypertension     Mammographic calcification found on diagnostic imaging of breast 02/13/2014    Breast calcifications    Other abnormal and inconclusive findings on diagnostic imaging of breast 02/13/2014    Abnormal mammogram    Parkinsons (Multi)     Personal history of other diseases of the respiratory system 05/30/2017    History of acute bronchitis    Personal history of other endocrine, nutritional and metabolic disease 05/20/2021    History of vitamin D deficiency    Personal history of other medical treatment     History of bone scan   [2]   Past Surgical History:  Procedure Laterality Date    COLONOSCOPY  04/21/2014    Complete Colonoscopy    MR HEAD ANGIO WO IV CONTRAST  4/2/2012    MR HEAD ANGIO WO IV CONTRAST 4/2/2012 [facility]    OTHER SURGICAL HISTORY  03/06/2014    Bx Breast Percutan Needle Core Use Imag Guide (Stereotactic)

## 2025-06-05 ENCOUNTER — APPOINTMENT (OUTPATIENT)
Dept: NEUROLOGY | Facility: CLINIC | Age: 75
End: 2025-06-05
Payer: MEDICARE

## 2025-06-05 VITALS
HEART RATE: 109 BPM | DIASTOLIC BLOOD PRESSURE: 64 MMHG | SYSTOLIC BLOOD PRESSURE: 143 MMHG | TEMPERATURE: 97 F | BODY MASS INDEX: 31.23 KG/M2 | HEIGHT: 67 IN | WEIGHT: 199 LBS

## 2025-06-05 DIAGNOSIS — K11.7 SIALORRHEA: ICD-10-CM

## 2025-06-05 DIAGNOSIS — G20.A1 PARKINSON'S DISEASE WITHOUT DYSKINESIA OR FLUCTUATING MANIFESTATIONS: Primary | ICD-10-CM

## 2025-06-05 ASSESSMENT — PAIN SCALES - GENERAL: PAINLEVEL_OUTOF10: 0-NO PAIN

## 2025-06-05 NOTE — PATIENT INSTRUCTIONS
Start taking Sinemet  mg 2 tablets, three times a day. Continue glycopyrrolate 1/2 tablet at bedtime.    Parkinson's Disease:  Here are some helpful resources for information about Parkinson's Disease:  - Parkinson's Foundation (https://www.parkinson.org)  - American Parkinson's Disease Association (https://www.apdaparkinson.org)  - [employer] for Parkinson's Research (https://www.Rayn.org)  - National [place] of Neurological Disorders and Stroke (https://www.ninds.nih.gov)    Support groups for patients and families:   - [place] Parkinson's Foundation [facility] (https://www.[employer])  - Parkinson's Foundation [facility] (https://www.parkinson.org/[place])    Studies show that regular exercise can help slow the progression of Parkinson's Disease and can help improve both motor symptoms (like stiffness, slowness, posture) and non-motor symptoms (like depression, fatigue, sleep disorders, anxiety).         Safety for patients with Parkinson's disease is very important. We want to prevent falls as much as possible, and this risk increases as the disease progresses. Consider changes in the home to make the bathtub or shower safer such as adding grab bars or a shower chair. Consider removing loose rugs and other tripping hazards around the house and try to make sure the house is well lit, especially at night. Physical therapy may be helpful for fall prevention. Speech therapy may also be helpful to help with slurred or quiet speech, and to help avoid choking and problems swallowing. [facility] can help locate Parkinson-trained speech therapists near you.     Some patients struggle with constipation from Parkinson's disease or Parkinson's medications. Make sure you speak with your provider if you are experiencing this. A daily stool softener can help keep you regulated. You can also try Miralax daily or every other day to keep you regulated.

## 2025-08-12 ENCOUNTER — APPOINTMENT (OUTPATIENT)
Dept: PRIMARY CARE | Facility: CLINIC | Age: 75
End: 2025-08-12
Payer: MEDICARE

## 2025-08-18 ENCOUNTER — APPOINTMENT (OUTPATIENT)
Dept: PRIMARY CARE | Facility: CLINIC | Age: 75
End: 2025-08-18
Payer: MEDICARE

## 2025-08-18 VITALS
WEIGHT: 203.7 LBS | BODY MASS INDEX: 31.9 KG/M2 | DIASTOLIC BLOOD PRESSURE: 70 MMHG | TEMPERATURE: 96.7 F | HEART RATE: 101 BPM | OXYGEN SATURATION: 98 % | SYSTOLIC BLOOD PRESSURE: 134 MMHG

## 2025-08-18 DIAGNOSIS — L03.039 INFECTION OF TOENAIL: Primary | ICD-10-CM

## 2025-08-18 PROCEDURE — 3075F SYST BP GE 130 - 139MM HG: CPT

## 2025-08-18 PROCEDURE — 1036F TOBACCO NON-USER: CPT

## 2025-08-18 PROCEDURE — 3078F DIAST BP <80 MM HG: CPT

## 2025-08-18 PROCEDURE — 1159F MED LIST DOCD IN RCRD: CPT

## 2025-08-18 PROCEDURE — 99212 OFFICE O/P EST SF 10 MIN: CPT

## 2025-08-18 RX ORDER — BACITRACIN ZINC 500 UNIT/G
OINTMENT (GRAM) TOPICAL 2 TIMES DAILY
Qty: 28 G | Refills: 0 | Status: SHIPPED | OUTPATIENT
Start: 2025-08-18

## 2025-12-04 ENCOUNTER — APPOINTMENT (OUTPATIENT)
Dept: NEUROLOGY | Facility: CLINIC | Age: 75
End: 2025-12-04
Payer: MEDICARE